# Patient Record
Sex: FEMALE
[De-identification: names, ages, dates, MRNs, and addresses within clinical notes are randomized per-mention and may not be internally consistent; named-entity substitution may affect disease eponyms.]

---

## 2020-10-09 ENCOUNTER — APPOINTMENT (OUTPATIENT)
Dept: ORTHOPEDIC SURGERY | Facility: CLINIC | Age: 70
End: 2020-10-09
Payer: MEDICARE

## 2020-10-09 VITALS
HEART RATE: 67 BPM | WEIGHT: 167 LBS | BODY MASS INDEX: 28.51 KG/M2 | OXYGEN SATURATION: 95 % | DIASTOLIC BLOOD PRESSURE: 62 MMHG | HEIGHT: 64 IN | SYSTOLIC BLOOD PRESSURE: 133 MMHG

## 2020-10-09 DIAGNOSIS — M25.561 PAIN IN RIGHT KNEE: ICD-10-CM

## 2020-10-09 DIAGNOSIS — S83.91XA SPRAIN OF UNSPECIFIED SITE OF RIGHT KNEE, INITIAL ENCOUNTER: ICD-10-CM

## 2020-10-09 PROBLEM — Z00.00 ENCOUNTER FOR PREVENTIVE HEALTH EXAMINATION: Status: ACTIVE | Noted: 2020-10-09

## 2020-10-09 PROCEDURE — 99204 OFFICE O/P NEW MOD 45 MIN: CPT

## 2020-10-09 PROCEDURE — 73564 X-RAY EXAM KNEE 4 OR MORE: CPT | Mod: RT

## 2020-10-09 NOTE — DISCUSSION/SUMMARY
[de-identified] : The patient and her son were advised the findings.  She will rest,  ice her knee, take NSAIDs as needed and utilize ambulatory assistance.  \par Follow-up in 2 weeks to check her progress if she remains symptomatic

## 2020-10-09 NOTE — HISTORY OF PRESENT ILLNESS
[Worsening] : worsening [___ days] : [unfilled] day(s) ago [4] : a minimum pain level of 4/10 [10] : a maximum pain level of 10/10 [Constant] : ~He/She~ states the symptoms seem to be constant [Walking] : worsened by walking [de-identified] : Pt presents for initial evaluation for pain in her right knee, pt evidently fell outside,  at home and landing on her right knee. Pt was seen in [facility], xray taken while lying down, to to follow up with an  Orthopedist,  pt was given Naprosyn which was helpful. Pt is in a wheel chair she is unable to bear weight. [Knee Flexion] : not worsened by knee flexion [de-identified] : certain  movements [de-identified] : medication.

## 2020-10-09 NOTE — PHYSICAL EXAM
[de-identified] : Physical examination of the right knee discloses no effusion no acute defects or deformities.  Range of motion from 0 to 110 degrees with mild tenderness no signs of instability Isac's and Apley's negative Lachman's [de-identified] : X-rays taken of the right knee in AP lateral skyline and open notch views do not disclose any acute osseous changes with the exception of questionable cortical 1 mm defect to the medial plateau